# Patient Record
Sex: FEMALE | Race: WHITE | NOT HISPANIC OR LATINO | Employment: FULL TIME | URBAN - METROPOLITAN AREA
[De-identification: names, ages, dates, MRNs, and addresses within clinical notes are randomized per-mention and may not be internally consistent; named-entity substitution may affect disease eponyms.]

---

## 2018-03-09 ENCOUNTER — TRANSCRIBE ORDERS (OUTPATIENT)
Dept: ADMINISTRATIVE | Facility: HOSPITAL | Age: 43
End: 2018-03-09

## 2018-03-09 DIAGNOSIS — Z12.31 VISIT FOR SCREENING MAMMOGRAM: Primary | ICD-10-CM

## 2018-03-28 ENCOUNTER — APPOINTMENT (OUTPATIENT)
Dept: MAMMOGRAPHY | Facility: HOSPITAL | Age: 43
End: 2018-03-28
Attending: OBSTETRICS & GYNECOLOGY

## 2018-04-12 ENCOUNTER — HOSPITAL ENCOUNTER (OUTPATIENT)
Dept: MAMMOGRAPHY | Facility: HOSPITAL | Age: 43
Discharge: HOME | End: 2018-04-12
Attending: OBSTETRICS & GYNECOLOGY | Admitting: INTERNAL MEDICINE

## 2018-04-12 DIAGNOSIS — Z12.31 VISIT FOR SCREENING MAMMOGRAM: ICD-10-CM

## 2018-04-12 PROCEDURE — 77063 BREAST TOMOSYNTHESIS BI: CPT

## 2018-04-12 PROCEDURE — 77067 SCR MAMMO BI INCL CAD: CPT | Performed by: RADIOLOGY

## 2018-04-12 PROCEDURE — 77067 SCR MAMMO BI INCL CAD: CPT

## 2018-04-12 PROCEDURE — 77063 BREAST TOMOSYNTHESIS BI: CPT | Performed by: RADIOLOGY

## 2019-09-23 ENCOUNTER — TRANSCRIBE ORDERS (OUTPATIENT)
Dept: FAMILY MEDICINE CLINIC | Facility: CLINIC | Age: 44
End: 2019-09-23

## 2019-09-23 DIAGNOSIS — Z12.31 VISIT FOR SCREENING MAMMOGRAM: Primary | ICD-10-CM

## 2019-11-21 ENCOUNTER — HOSPITAL ENCOUNTER (OUTPATIENT)
Dept: MAMMOGRAPHY | Facility: HOSPITAL | Age: 44
Discharge: HOME | End: 2019-11-21
Admitting: INTERNAL MEDICINE

## 2019-11-21 DIAGNOSIS — Z12.31 VISIT FOR SCREENING MAMMOGRAM: ICD-10-CM

## 2019-11-21 PROCEDURE — 77063 BREAST TOMOSYNTHESIS BI: CPT

## 2019-11-21 PROCEDURE — 77063 BREAST TOMOSYNTHESIS BI: CPT | Performed by: RADIOLOGY

## 2019-11-21 PROCEDURE — 77067 SCR MAMMO BI INCL CAD: CPT

## 2019-11-21 PROCEDURE — 77067 SCR MAMMO BI INCL CAD: CPT | Performed by: RADIOLOGY

## 2020-09-30 ENCOUNTER — OFFICE VISIT (OUTPATIENT)
Dept: OBSTETRICS AND GYNECOLOGY | Facility: CLINIC | Age: 45
End: 2020-09-30

## 2020-09-30 VITALS
DIASTOLIC BLOOD PRESSURE: 68 MMHG | WEIGHT: 142.7 LBS | HEIGHT: 65 IN | SYSTOLIC BLOOD PRESSURE: 108 MMHG | BODY MASS INDEX: 23.78 KG/M2

## 2020-09-30 DIAGNOSIS — Z80.0 FAMILY HISTORY OF COLON CANCER: ICD-10-CM

## 2020-09-30 DIAGNOSIS — N84.0 POLYP OF CORPUS UTERI: ICD-10-CM

## 2020-09-30 DIAGNOSIS — Z01.419 ENCOUNTER FOR ANNUAL ROUTINE GYNECOLOGICAL EXAMINATION: Primary | ICD-10-CM

## 2020-09-30 PROCEDURE — 99386 PREV VISIT NEW AGE 40-64: CPT | Performed by: OBSTETRICS & GYNECOLOGY

## 2020-09-30 RX ORDER — VORTIOXETINE 10 MG/1
TABLET, FILM COATED ORAL
COMMUNITY
End: 2021-11-15

## 2020-09-30 RX ORDER — MELATONIN
2000 DAILY
COMMUNITY

## 2020-09-30 RX ORDER — MULTIPLE VITAMINS W/ MINERALS TAB 9MG-400MCG
1 TAB ORAL DAILY
COMMUNITY

## 2020-09-30 RX ORDER — LIOTHYRONINE SODIUM 5 UG/1
10 TABLET ORAL DAILY
COMMUNITY
Start: 2020-07-09

## 2020-09-30 RX ORDER — ALPRAZOLAM 0.25 MG/1
TABLET ORAL
COMMUNITY
End: 2021-11-15

## 2020-09-30 RX ORDER — CHLORAL HYDRATE 500 MG
CAPSULE ORAL
COMMUNITY

## 2020-09-30 RX ORDER — LEVOTHYROXINE SODIUM 0.03 MG/1
25 TABLET ORAL DAILY
COMMUNITY
Start: 2020-07-09

## 2020-09-30 NOTE — PROGRESS NOTES
GYN Annual Exam     CC - Here for annual exam.        MARTY  Ashlee Bagley is a 45 y.o. female, , who presents for annual well woman exam. Patient's last menstrual period was 2020 (exact date)..  Periods are regular every 25-35 days, lasting 4 days. .  Dysmenorrhea:none.  Patient reports problems with: anxiety and moodiness prior to start of menses.  Partner Status: Marital Status: .  New Partners since last visit: no.  Desires STD Screening: no.  Patient states she still having periods.  They are becoming lighter.  Has been on compounded testosterone cream and progesterone due to decreased libido.    Additional OB/GYN History   Current contraception: contraceptive methods: Vasectomy     Last Pap :   Last Completed Pap Smear       Status Date      PAP SMEAR Done 2019 negative        History of abnormal Pap smear: no  Family history of uterine, colon, breast, or ovarian cancer: yes - colon cancer in brother  Performs monthly Self-Breast Exam: no  Last mammogram:   Last Completed Mammogram     Patient has no health maintenance due at this time         Exercises Regularly: yes  Feelings of Anxiety or Depression: no  Tobacco Usage?: No   OB History        4    Para   2    Term   2            AB   2    Living   2       SAB   1    TAB        Ectopic        Molar        Multiple        Live Births   2                Health Maintenance   Topic Date Due   • Annual Gynecologic Pelvic and Breast Exam  1973   • ANNUAL PHYSICAL  1976   • INFLUENZA VACCINE  2020   • HEPATITIS C SCREENING  2020   • COLONOSCOPY  11/15/2021   • PAP SMEAR  2022   • TDAP/TD VACCINES (2 - Td) 2024   • Pneumococcal Vaccine 0-64  Aged Out       The additional following portions of the patient's history were reviewed and updated as appropriate: allergies, current medications, past family history, past medical history, past social history, past surgical history and problem  "list.    Review of Systems   All other systems reviewed and are negative.    All other systems reviewed and are negative.     I have reviewed and agree with the HPI, ROS, and historical information as entered above. Tyron Bauer MD    Objective   /68 (BP Location: Right arm, Patient Position: Sitting, Cuff Size: Adult)   Ht 165.1 cm (65\")   Wt 64.7 kg (142 lb 11.2 oz)   LMP 09/01/2020 (Exact Date)   Breastfeeding No   BMI 23.75 kg/m²     Physical Exam  Vitals signs and nursing note reviewed. Exam conducted with a chaperone present.   Constitutional:       Appearance: She is well-developed.   HENT:      Head: Normocephalic and atraumatic.   Neck:      Musculoskeletal: Normal range of motion. No muscular tenderness.      Thyroid: No thyroid mass or thyromegaly.   Cardiovascular:      Rate and Rhythm: Normal rate and regular rhythm.      Heart sounds: No murmur.   Pulmonary:      Effort: Pulmonary effort is normal. No retractions.      Breath sounds: Normal breath sounds. No wheezing, rhonchi or rales.   Chest:      Chest wall: No mass or tenderness.      Breasts:         Right: Normal. No mass, nipple discharge, skin change or tenderness.         Left: Normal. No mass, nipple discharge, skin change or tenderness.      Comments: Breast implants present bilaterally.  No contraction or tenderness.  Abdominal:      General: Bowel sounds are normal.      Palpations: Abdomen is soft. Abdomen is not rigid. There is no mass.      Tenderness: There is no abdominal tenderness. There is no guarding.      Hernia: No hernia is present. There is no hernia in the left inguinal area.   Genitourinary:     Labia:         Right: No rash, tenderness or lesion.         Left: No rash, tenderness or lesion.       Vagina: Normal. No vaginal discharge or lesions.      Cervix: No cervical motion tenderness, discharge, lesion or cervical bleeding.      Uterus: Normal. Not enlarged, not fixed and not tender.       Adnexa:       "   Right: No mass or tenderness.          Left: No mass or tenderness.        Rectum: No external hemorrhoid.   Neurological:      Mental Status: She is alert and oriented to person, place, and time.   Psychiatric:         Behavior: Behavior normal.            Assessment and Plan    Problem List Items Addressed This Visit        Genitourinary    Polyp of corpus uteri    Overview     MyoSure resection of polyp; 7/31/2015            Other    Family history of colon cancer    Overview     Brother; age 44           Other Visit Diagnoses     Encounter for annual routine gynecological examination    -  Primary    Relevant Orders    Pap IG, Rfx HPV ASCU    Mammo Screening Digital Tomosynthesis Bilateral With CAD          1. GYN annual well woman exam.  45 years old.  Has mammogram scheduled.  2. Reviewed monthly self breast exams.  Instructed to call with lumps, pain, or breast discharge.    3. Ordered Mammogram today  4. RTC in 1 year or PRN with problems.  5.   Patient had last colonoscopy in 2018 with 1 polyp removed.  She is going to repeat in 3 years.  6.   Decreased libido.  Has been getting compounded testosterone and progesterone from outside clinic.    Tyron Bauer MD  09/30/2020

## 2021-01-04 ENCOUNTER — HOSPITAL ENCOUNTER (OUTPATIENT)
Dept: MAMMOGRAPHY | Facility: HOSPITAL | Age: 46
Discharge: HOME | End: 2021-01-04
Admitting: OBSTETRICS & GYNECOLOGY

## 2021-01-04 DIAGNOSIS — Z01.419 ENCOUNTER FOR ANNUAL ROUTINE GYNECOLOGICAL EXAMINATION: ICD-10-CM

## 2021-01-04 PROCEDURE — 77067 SCR MAMMO BI INCL CAD: CPT

## 2021-01-04 PROCEDURE — 77067 SCR MAMMO BI INCL CAD: CPT | Performed by: RADIOLOGY

## 2021-01-04 PROCEDURE — 77063 BREAST TOMOSYNTHESIS BI: CPT | Performed by: RADIOLOGY

## 2021-01-04 PROCEDURE — 77063 BREAST TOMOSYNTHESIS BI: CPT

## 2021-11-15 ENCOUNTER — OFFICE VISIT (OUTPATIENT)
Dept: OBSTETRICS AND GYNECOLOGY | Facility: CLINIC | Age: 46
End: 2021-11-15

## 2021-11-15 VITALS
SYSTOLIC BLOOD PRESSURE: 110 MMHG | WEIGHT: 147 LBS | HEIGHT: 63 IN | BODY MASS INDEX: 26.05 KG/M2 | DIASTOLIC BLOOD PRESSURE: 72 MMHG

## 2021-11-15 DIAGNOSIS — Z12.11 SCREENING FOR COLON CANCER: ICD-10-CM

## 2021-11-15 DIAGNOSIS — Z12.31 ENCOUNTER FOR SCREENING MAMMOGRAM FOR MALIGNANT NEOPLASM OF BREAST: ICD-10-CM

## 2021-11-15 DIAGNOSIS — Z80.0 FAMILY HISTORY OF COLON CANCER: ICD-10-CM

## 2021-11-15 DIAGNOSIS — N84.0 POLYP OF CORPUS UTERI: ICD-10-CM

## 2021-11-15 DIAGNOSIS — N92.3 INTERMENSTRUAL BLEEDING: ICD-10-CM

## 2021-11-15 DIAGNOSIS — Z01.419 ENCOUNTER FOR ANNUAL ROUTINE GYNECOLOGICAL EXAMINATION: Primary | ICD-10-CM

## 2021-11-15 DIAGNOSIS — Z12.4 SCREENING FOR CERVICAL CANCER: ICD-10-CM

## 2021-11-15 PROCEDURE — 99396 PREV VISIT EST AGE 40-64: CPT | Performed by: OBSTETRICS & GYNECOLOGY

## 2021-11-15 RX ORDER — OMEPRAZOLE 10 MG/1
CAPSULE, DELAYED RELEASE ORAL
COMMUNITY

## 2021-11-15 RX ORDER — ESCITALOPRAM OXALATE 10 MG/1
TABLET ORAL
COMMUNITY
Start: 2021-11-08

## 2021-11-15 NOTE — PROGRESS NOTES
GYN Annual Exam     CC - Here for annual exam.        HPI  Ashlee Bagley is a 46 y.o. female, , who presents for annual well woman exam. Patient's last menstrual period was 11/10/2021 (approximate)..  Periods are regular every 25-35 days, lasting 4 days. .  Dysmenorrhea:mild, occurring first 1-2 days of flow.  Patient reports problems with: none. There were no changes to her medical or surgical history since her last visit.. Partner Status: Marital Status: .  New Partners since last visit: no.  Desires STD Screening: no.  Has been post vasectomy.    Patient states that her menses have been regular since she has noticed episodes of intermenstrual bleeding within the last 7 months.  That have been random and brief.  She also had some spotting after a massage.  Patient is on nightly progesterone for sleep and denies any relation of the bleeding to starting or stopping the progesterone.  She is on Lexapro for anxiety and states it is stable.  She is followed by PCP for hypothyroidism.    Additional OB/GYN History   Current contraception: contraceptive methods: None  Desires to: do not start contraception  Last Pap :   Last Completed Pap Smear          Ordered - PAP SMEAR (Every 3 Years) Ordered on 11/15/2021    2020  Pap IG, Rfx HPV ASCU    2019  Done - negative              History of abnormal Pap smear: no  Family history of uterine, colon, breast, or ovarian cancer: yes - brother - colon cancer  Performs monthly Self-Breast Exam: no  Last mammogram: 2021    Last Completed Mammogram     This patient has no relevant Health Maintenance data.         Exercises Regularly: yes  Feelings of Anxiety or Depression: yes - Treated  Tobacco Usage?: No   OB History        4    Para   2    Term   2            AB   2    Living   2       SAB   1    IAB        Ectopic        Molar        Multiple        Live Births   2                Health Maintenance   Topic Date Due   • ANNUAL  "PHYSICAL  Never done   • COVID-19 Vaccine (1) Never done   • HEPATITIS C SCREENING  Never done   • INFLUENZA VACCINE  08/01/2021   • Annual Gynecologic Pelvic and Breast Exam  10/01/2021   • COLORECTAL CANCER SCREENING  11/15/2021   • PAP SMEAR  09/30/2023   • TDAP/TD VACCINES (2 - Td or Tdap) 01/01/2024   • Pneumococcal Vaccine 0-64  Aged Out       The additional following portions of the patient's history were reviewed and updated as appropriate: allergies, current medications, past family history, past medical history, past social history, past surgical history and problem list.    Review of Systems   Constitutional: Negative.    HENT: Negative.    Eyes: Negative.    Respiratory: Negative.    Cardiovascular: Negative.    Gastrointestinal: Negative.    Endocrine: Negative.    Genitourinary: Negative.    Musculoskeletal: Negative.    Skin: Negative.    Allergic/Immunologic: Negative.    Neurological: Negative.    Hematological: Negative.    Psychiatric/Behavioral: Negative.          I have reviewed and agree with the HPI, ROS, and historical information as entered above. Tyron Bauer MD    Objective   /72   Ht 160 cm (63\")   Wt 66.7 kg (147 lb)   LMP 11/10/2021 (Approximate)   BMI 26.04 kg/m²     Physical Exam  Vitals and nursing note reviewed. Exam conducted with a chaperone present.   Constitutional:       Appearance: She is well-developed.   HENT:      Head: Normocephalic and atraumatic.   Neck:      Thyroid: No thyroid mass or thyromegaly.   Cardiovascular:      Rate and Rhythm: Normal rate and regular rhythm.      Heart sounds: No murmur heard.      Pulmonary:      Effort: Pulmonary effort is normal. No retractions.      Breath sounds: Normal breath sounds. No wheezing, rhonchi or rales.   Chest:      Chest wall: No mass or tenderness.   Breasts:      Right: Normal. No mass, nipple discharge, skin change or tenderness.      Left: Normal. No mass, nipple discharge, skin change or tenderness. "        Comments: Bilateral breast implants in place.  Abdominal:      General: Bowel sounds are normal. There is no distension.      Palpations: Abdomen is soft. Abdomen is not rigid. There is no mass.      Tenderness: There is no abdominal tenderness. There is no guarding.      Hernia: No hernia is present. There is no hernia in the left inguinal area.   Genitourinary:     Labia:         Right: No rash, tenderness or lesion.         Left: No rash, tenderness or lesion.       Vagina: Normal. No vaginal discharge or lesions.      Cervix: No cervical motion tenderness, discharge, lesion or cervical bleeding.      Uterus: Normal. Not enlarged, not fixed and not tender.       Adnexa:         Right: No mass or tenderness.          Left: No mass or tenderness.        Rectum: No external hemorrhoid.      Comments: Cervix with dark spotting present for menses.  Musculoskeletal:      Cervical back: Normal range of motion. No muscular tenderness.   Neurological:      Mental Status: She is alert and oriented to person, place, and time.   Psychiatric:         Behavior: Behavior normal.            Assessment and Plan    Problem List Items Addressed This Visit     Family history of colon cancer    Overview     Brother; age 44         Polyp of corpus uteri    Overview     MyoSure resection of polyp; 7/31/2015         Screening for cervical cancer    Overview     Pap 9/25/2019 was negative  Pap smear 9/30/2020 was negative.         Encounter for screening mammogram for malignant neoplasm of breast    Overview     Mammogram 1/4/2021 was negative.         Screening for colon cancer    Overview     2018 1 polyp benign. 5 year repeat.          Intermenstrual bleeding    Overview     2 episodes of intermenstrual bleeding over the past 7 months.  Also noticed some spotting after a massage.  Has history of endometrial polyp.    Will need ultrasound if recurs.           Other Visit Diagnoses     Encounter for annual routine gynecological  examination    -  Primary    Relevant Orders    Pap IG, Rfx HPV ASCU    Mammo Screening Digital Tomosynthesis Bilateral With CAD          1. GYN annual well woman exam.  46 years of age.  States menses are still regular.  2. Had normal Pap smear for last 2 years.  Pap not indicated this year.  3. Due for mammogram in January.  4. Colonoscopy 2018 with 5-year repeat; family history of colon cancer in her brother.  5. Intermenstrual spotting x2.  Will need ultrasound if recurs.  6. Reviewed monthly self breast exams.  Instructed to call with lumps, pain, or breast discharge.    7. Ordered Mammogram today  8. Reviewed exercise as a preventative health measures.   9. Reccommended Flu Vaccine in Fall of each year.  10. RTC in 1 year or PRN with problems.  11. Return in about 1 year (around 11/15/2022) for Annual physical.     Tyron Bauer MD  11/15/2021

## 2021-11-24 DIAGNOSIS — Z01.419 ENCOUNTER FOR ANNUAL ROUTINE GYNECOLOGICAL EXAMINATION: ICD-10-CM

## 2022-01-20 ENCOUNTER — APPOINTMENT (OUTPATIENT)
Dept: MAMMOGRAPHY | Facility: HOSPITAL | Age: 47
End: 2022-01-20

## 2022-02-11 ENCOUNTER — APPOINTMENT (OUTPATIENT)
Dept: MAMMOGRAPHY | Facility: HOSPITAL | Age: 47
End: 2022-02-11

## 2022-03-23 ENCOUNTER — HOSPITAL ENCOUNTER (OUTPATIENT)
Dept: MAMMOGRAPHY | Facility: HOSPITAL | Age: 47
Discharge: HOME | End: 2022-03-23
Admitting: INTERNAL MEDICINE

## 2022-03-23 DIAGNOSIS — Z01.419 ENCOUNTER FOR ANNUAL ROUTINE GYNECOLOGICAL EXAMINATION: ICD-10-CM

## 2022-03-23 PROCEDURE — 77063 BREAST TOMOSYNTHESIS BI: CPT

## 2022-03-23 PROCEDURE — 77063 BREAST TOMOSYNTHESIS BI: CPT | Performed by: RADIOLOGY

## 2022-03-23 PROCEDURE — 77067 SCR MAMMO BI INCL CAD: CPT | Performed by: RADIOLOGY

## 2022-03-23 PROCEDURE — 77067 SCR MAMMO BI INCL CAD: CPT

## 2022-05-06 ENCOUNTER — HOSPITAL ENCOUNTER (OUTPATIENT)
Dept: MAMMOGRAPHY | Facility: HOSPITAL | Age: 47
Discharge: HOME | End: 2022-05-06
Admitting: RADIOLOGY

## 2022-05-06 DIAGNOSIS — R92.8 ABNORMAL MAMMOGRAM: ICD-10-CM

## 2022-05-06 PROCEDURE — 77061 BREAST TOMOSYNTHESIS UNI: CPT | Performed by: RADIOLOGY

## 2022-05-06 PROCEDURE — G0279 TOMOSYNTHESIS, MAMMO: HCPCS

## 2022-05-06 PROCEDURE — 77065 DX MAMMO INCL CAD UNI: CPT

## 2022-05-06 PROCEDURE — 77065 DX MAMMO INCL CAD UNI: CPT | Performed by: RADIOLOGY

## 2023-03-13 ENCOUNTER — TRANSCRIBE ORDERS (OUTPATIENT)
Dept: ADMINISTRATIVE | Facility: HOSPITAL | Age: 48
End: 2023-03-13
Payer: COMMERCIAL

## 2023-03-13 DIAGNOSIS — Z12.31 VISIT FOR SCREENING MAMMOGRAM: Primary | ICD-10-CM

## 2023-05-24 ENCOUNTER — OFFICE VISIT (OUTPATIENT)
Dept: OBSTETRICS AND GYNECOLOGY | Facility: CLINIC | Age: 48
End: 2023-05-24
Payer: COMMERCIAL

## 2023-05-24 VITALS
SYSTOLIC BLOOD PRESSURE: 102 MMHG | WEIGHT: 142.6 LBS | BODY MASS INDEX: 25.27 KG/M2 | HEIGHT: 63 IN | DIASTOLIC BLOOD PRESSURE: 70 MMHG

## 2023-05-24 DIAGNOSIS — Z01.419 ENCOUNTER FOR ANNUAL ROUTINE GYNECOLOGICAL EXAMINATION: Primary | ICD-10-CM

## 2023-05-24 DIAGNOSIS — Z12.31 ENCOUNTER FOR SCREENING MAMMOGRAM FOR MALIGNANT NEOPLASM OF BREAST: ICD-10-CM

## 2023-05-24 NOTE — PROGRESS NOTES
Gynecologic Annual Exam Note          GYN Annual Exam     Gynecologic Exam        Subjective     HPI  Ashlee Bagley is a 48 y.o. female, , who presents for annual well woman exam as a established patient. Patient's last menstrual period was 2023 (approximate). Patient reports problems with: Her periods have been monthly up until Feb, no period in March and spotting in April., lasting 5 days. The flow is light.. She reports dysmenorrhea is none. Partner Status: Marital Status: . She is is sexually active. She has not had new partners.. STD testing recommendations have been explained to the patient and she does not desire STD testing. There were no changes to her medical or surgical history since her last visit..       Additional OB/GYN History   Current contraception: contraceptive methods: Vasectomy   Desires to: continue contraception    Last Pap : 11/15/21. Result: negative. HPV: not done.   Last Completed Pap Smear          Ordered - PAP SMEAR (Every 3 Years) Ordered on 2023    11/15/2021  SCANNED - PAP SMEAR    2020  Pap IG, Rfx HPV ASCU    2019  Done - negative              History of abnormal Pap smear: states unsure of possibility of one in early 20's.  Family history of uterine, colon, breast, or ovarian cancer: yes - colon-brother  Performs monthly Self-Breast Exam: yes  Last mammogram: 22. Done at  Neg hx of left breast asymmetry      Last Completed Mammogram          Ordered - MAMMOGRAM (Every 2 Years) Ordered on 3/13/2023    2022  Mammo Diagnostic Digital Tomosynthesis Left With CAD    2022  Mammo Screening Digital Tomosynthesis Bilateral With CAD    2021  Mammo Screening Digital Tomosynthesis Bilateral With CAD    2019  Mammo Screening Digital Tomosynthesis Bilateral With CAD    2018  Mammo Screening Digital Tomosynthesis Bilateral With CAD    Only the first 5 history entries have been loaded, but more history exists.                 History of abnormal mammogram:     Colonoscopy: has had a colonoscopy 5 year(s) ago. polyp and diverticulosis   Exercises Regularly: yes  Feelings of Anxiety or Depression: no  Tobacco Usage?: No       Current Outpatient Medications:   •  cholecalciferol (VITAMIN D3) 25 MCG (1000 UT) tablet, Take 2 tablets by mouth Daily., Disp: , Rfl:   •  escitalopram (LEXAPRO) 10 MG tablet, , Disp: , Rfl:   •  levothyroxine (SYNTHROID, LEVOTHROID) 25 MCG tablet, Take 1 tablet by mouth Daily., Disp: , Rfl:   •  liothyronine (CYTOMEL) 5 MCG tablet, Take 2 tablets by mouth Daily., Disp: , Rfl:   •  Multiple Vitamins-Minerals (multivitamin with minerals) tablet tablet, Take 1 tablet by mouth Daily., Disp: , Rfl:   •  Omega-3 Fatty Acids (fish oil) 1000 MG capsule capsule, Take  by mouth Daily With Breakfast., Disp: , Rfl:   •  omeprazole (prilOSEC) 10 MG capsule, omeprazole 10 mg capsule,delayed release  Take 2 capsules every day by oral route., Disp: , Rfl:   •  progesterone (PROMETRIUM) 200 MG capsule, TAKE ONE Capsule BY MOUTH EVERY NIGHT AT BEDTIME, Disp: , Rfl:   •  Testosterone 20 % cream, apply TWO clicks twice daily, Disp: , Rfl:      Patient denies the need for medication refills today.    OB History        4    Para   2    Term   2            AB   2    Living   2       SAB   1    IAB        Ectopic        Molar        Multiple        Live Births   2                Past Medical History:   Diagnosis Date   • Polyp, corpus uteri     Myosure resection 2015 - benign   • Situational anxiety         Past Surgical History:   Procedure Laterality Date   • AUGMENTATION MAMMAPLASTY Bilateral     Silicone   • COLONOSCOPY  2018    1 benign polyp removed, diverticulosis; repeat 3 years   • DILATATION AND CURETTAGE  2004    MAB   • MYOSURE  2015    RESECTION OF POLYPS;BENIGN       Health Maintenance   Topic Date Due   • HEPATITIS C SCREENING  Never done   • ANNUAL PHYSICAL  Never done   •  "COLORECTAL CANCER SCREENING  11/15/2021   • COVID-19 Vaccine (6 - Booster for Moderna series) 01/14/2022   • Annual Gynecologic Pelvic and Breast Exam  11/16/2022   • ZOSTER VACCINE (1 of 2) Never done   • INFLUENZA VACCINE  08/01/2023   • TDAP/TD VACCINES (2 - Td or Tdap) 01/01/2024   • MAMMOGRAM  05/06/2024   • PAP SMEAR  11/15/2024   • Pneumococcal Vaccine 0-64  Aged Out       The additional following portions of the patient's history were reviewed and updated as appropriate: allergies, current medications, past family history, past medical history, past social history, past surgical history and problem list.    Review of Systems   Constitutional: Negative.    Cardiovascular: Negative.    Gastrointestinal: Negative.    Genitourinary: Negative.    Psychiatric/Behavioral: Negative.          I have reviewed and agree with the HPI, ROS, and historical information as entered above. Marlen Main CAMARA        Objective   /70 Comment: 80/60  Ht 160 cm (63\")   Wt 64.7 kg (142 lb 9.6 oz)   LMP 04/14/2023 (Approximate)   BMI 25.26 kg/m²     Physical Exam  Vitals and nursing note reviewed. Exam conducted with a chaperone present.   Constitutional:       Appearance: She is well-developed.   HENT:      Head: Normocephalic and atraumatic.   Neck:      Thyroid: No thyroid mass or thyromegaly.   Cardiovascular:      Rate and Rhythm: Normal rate and regular rhythm.      Heart sounds: No murmur heard.  Pulmonary:      Effort: Pulmonary effort is normal. No retractions.      Breath sounds: Normal breath sounds. No wheezing, rhonchi or rales.   Chest:      Chest wall: No mass or tenderness.   Breasts:     Right: Normal. No mass, nipple discharge, skin change or tenderness.      Left: Normal. No mass, nipple discharge, skin change or tenderness.      Comments: Bilateral breast implants present  Abdominal:      Palpations: Abdomen is soft. Abdomen is not rigid. There is no mass.      Tenderness: There is no abdominal " tenderness. There is no guarding.      Hernia: No hernia is present.   Genitourinary:     General: Normal vulva.      Labia:         Right: No rash, tenderness or lesion.         Left: No rash, tenderness or lesion.       Vagina: Normal. No vaginal discharge or lesions.      Cervix: No cervical motion tenderness, discharge, lesion or cervical bleeding.      Uterus: Normal. Not enlarged, not fixed and not tender.       Adnexa: Right adnexa normal and left adnexa normal.        Right: No mass or tenderness.          Left: No mass or tenderness.        Rectum: Normal. No external hemorrhoid.   Musculoskeletal:      Cervical back: Normal range of motion. No muscular tenderness.   Neurological:      Mental Status: She is alert and oriented to person, place, and time.   Psychiatric:         Behavior: Behavior normal.            Assessment and Plan    Problem List Items Addressed This Visit    None  Visit Diagnoses     Encounter for annual routine gynecological examination    -  Primary    Relevant Orders    LIQUID-BASED PAP SMEAR, [facility] ([facility])          1. GYN annual well woman exam.   2. Pap guidelines reviewed.  3. Will monitor periods for now  4. Colonoscopy due this year. Pt. Is aware of this.  5. Reviewed monthly self breast exams.  Instructed to call with lumps, pain, or breast discharge.    6. Ordered Mammogram today  7. Reviewed exercise as a preventative health measures.   8. Reccommended Flu Vaccine in Fall of each year.  9. RTC in 1 year or PRN with problems.      Marlen Quach, APRN  05/24/2023

## 2023-05-26 LAB — REF LAB TEST METHOD: NORMAL

## 2024-04-29 RX ORDER — SODIUM, POTASSIUM,MAG SULFATES 17.5-3.13G
1 SOLUTION, RECONSTITUTED, ORAL ORAL EVERY 12 HOURS
Qty: 354 ML | Refills: 0 | Status: SHIPPED | OUTPATIENT
Start: 2024-04-29

## 2024-05-15 ENCOUNTER — OUTSIDE FACILITY SERVICE (OUTPATIENT)
Dept: GASTROENTEROLOGY | Facility: CLINIC | Age: 49
End: 2024-05-15
Payer: COMMERCIAL

## 2024-05-15 PROCEDURE — 45378 DIAGNOSTIC COLONOSCOPY: CPT | Performed by: INTERNAL MEDICINE

## 2024-06-03 ENCOUNTER — OFFICE VISIT (OUTPATIENT)
Dept: OBSTETRICS AND GYNECOLOGY | Facility: CLINIC | Age: 49
End: 2024-06-03
Payer: COMMERCIAL

## 2024-06-03 VITALS
DIASTOLIC BLOOD PRESSURE: 72 MMHG | BODY MASS INDEX: 24.38 KG/M2 | HEIGHT: 63 IN | SYSTOLIC BLOOD PRESSURE: 102 MMHG | WEIGHT: 137.6 LBS

## 2024-06-03 DIAGNOSIS — Z12.11 SCREENING FOR COLON CANCER: ICD-10-CM

## 2024-06-03 DIAGNOSIS — Z79.890 HORMONE REPLACEMENT THERAPY (HRT): ICD-10-CM

## 2024-06-03 DIAGNOSIS — Z12.31 ENCOUNTER FOR SCREENING MAMMOGRAM FOR MALIGNANT NEOPLASM OF BREAST: ICD-10-CM

## 2024-06-03 DIAGNOSIS — Z12.4 SCREENING FOR CERVICAL CANCER: Primary | ICD-10-CM

## 2024-06-03 DIAGNOSIS — Z80.0 FAMILY HISTORY OF COLON CANCER: ICD-10-CM

## 2024-06-03 PROCEDURE — 99396 PREV VISIT EST AGE 40-64: CPT | Performed by: OBSTETRICS & GYNECOLOGY

## 2024-06-03 RX ORDER — DESVENLAFAXINE SUCCINATE 50 MG/1
25 TABLET, EXTENDED RELEASE ORAL DAILY
COMMUNITY

## 2024-06-03 NOTE — PROGRESS NOTES
Gynecologic Annual Exam Note        GYN Annual Exam     CC - Here for annual exam.        HPI  Ashlee Bagley is a 49 y.o. female, , who presents for annual well woman exam as a established patient.  She is postmenopausal.. Reports vaginal bleeding, describes as spotting for three days in 2024.  Reports this with missing a dose of progesterone. Pt states that her hormones have been checked and pt states that she is menopausal and is followed by physician with testosterone and progesterone.  Since her last visit the patient has stopped having a period. Marital Status: .  She is sexually active. She has not had new partners.. STD testing recommendations have been explained to the patient and she declines STD testing.    The patient would like to discuss the following complaints today: None    Additional OB/GYN History   On HRT? Yes. Details: progesterone     Last Pap : 23. Results: negative. HPV: not done. Her last HPV testing was never.  Last Completed Pap Smear            Ordered - PAP SMEAR (Every 3 Years) Ordered on 6/3/2024      2023  LIQUID-BASED PAP SMEAR, [facility] ([facility])    11/15/2021  SCANNED - PAP SMEAR    2020  Pap IG, Rfx HPV ASCU    2019  Done - negative                  History of abnormal Pap smear: no  Family history of uterine, colon, breast, or ovarian cancer: yes - Brother-colon  Performs monthly Self-Breast Exam: yes  Last mammogram: 23. Done at . There is a copy in the chart.    Last Completed Mammogram            MAMMOGRAM (Every 2 Years) Next due on 2023  Mammo Screening Digital Tomosynthesis Bilateral With CAD    2022  Mammo Diagnostic Digital Tomosynthesis Left With CAD    2022  Mammo Screening Digital Tomosynthesis Bilateral With CAD    2021  Mammo Screening Digital Tomosynthesis Bilateral With CAD    2019  Mammo Screening Digital Tomosynthesis Bilateral With CAD    Only the first 5  history entries have been loaded, but more history exists.                  Last colonoscopy: has had a colonoscopy 1 month ago Neg    Last Completed Colonoscopy            COLORECTAL CANCER SCREENING (COLONOSCOPY - Every 3 Years) Next due on 5/15/2027      05/15/2024  Colonoscopy, Scan    11/15/2018  COLONOSCOPY (Done - 1 benign polyp removed, diverticulosis)                    She has never had a bone density scan  Exercises Regularly: yes  Feelings of Anxiety or Depression: yes - anxiety      Tobacco Usage?: No       Current Outpatient Medications:     cholecalciferol (VITAMIN D3) 25 MCG (1000 UT) tablet, Take 2 tablets by mouth Daily., Disp: , Rfl:     desvenlafaxine (PRISTIQ) 50 MG 24 hr tablet, Take 25 mg by mouth Daily., Disp: , Rfl:     levothyroxine (SYNTHROID, LEVOTHROID) 25 MCG tablet, Take 1 tablet by mouth Daily., Disp: , Rfl:     liothyronine (CYTOMEL) 5 MCG tablet, Take 2 tablets by mouth Daily., Disp: , Rfl:     Multiple Vitamins-Minerals (multivitamin with minerals) tablet tablet, Take 1 tablet by mouth Daily., Disp: , Rfl:     Omega-3 Fatty Acids (fish oil) 1000 MG capsule capsule, Take  by mouth Daily With Breakfast., Disp: , Rfl:     progesterone (PROMETRIUM) 200 MG capsule, TAKE ONE Capsule BY MOUTH EVERY NIGHT AT BEDTIME, Disp: , Rfl:     Testosterone 20 % cream, apply TWO clicks twice daily, Disp: , Rfl:     escitalopram (LEXAPRO) 10 MG tablet, , Disp: , Rfl:     omeprazole (prilOSEC) 10 MG capsule, omeprazole 10 mg capsule,delayed release  Take 2 capsules every day by oral route., Disp: , Rfl:     sodium-potassium-magnesium sulfates (Suprep Bowel Prep Kit) 17.5-3.13-1.6 GM/177ML solution oral solution, Take 1 bottle by mouth Every 12 (Twelve) Hours. (Patient not taking: Reported on 6/3/2024), Disp: 354 mL, Rfl: 0    Patient denies the need for medication refills today.    OB History          4    Para   2    Term   2            AB   2    Living   2         SAB   1    IAB         "Ectopic        Molar        Multiple        Live Births   2                Past Medical History:   Diagnosis Date    Polyp, corpus uteri     Myosure resection 7/31/2015 - benign    Situational anxiety         Past Surgical History:   Procedure Laterality Date    AUGMENTATION MAMMAPLASTY Bilateral 2011    Silicone    COLONOSCOPY  11/2018    1 benign polyp removed, diverticulosis; repeat 3 years    DILATATION AND CURETTAGE  02/2004    [facility]    MYOSURE  07/31/2015    RESECTION OF POLYPS;BENIGN       Health Maintenance   Topic Date Due    HEPATITIS C SCREENING  Never done    ANNUAL PHYSICAL  Never done    ZOSTER VACCINE (1 of 2) Never done    COVID-19 Vaccine (6 - 2023-24 season) 09/01/2023    TDAP/TD VACCINES (2 - Td or Tdap) 01/01/2024    Annual Gynecologic Pelvic and Breast Exam  05/25/2024    INFLUENZA VACCINE  08/01/2024    MAMMOGRAM  06/28/2025    PAP SMEAR  05/24/2026    COLORECTAL CANCER SCREENING  05/15/2027    Pneumococcal Vaccine 0-64  Aged Out       The additional following portions of the patient's history were reviewed and updated as appropriate: allergies, current medications, past family history, past medical history, past social history, past surgical history, and problem list.    Review of Systems   Constitutional: Negative.    HENT: Negative.     Eyes: Negative.    Respiratory: Negative.     Cardiovascular: Negative.    Gastrointestinal: Negative.    Endocrine: Negative.    Genitourinary: Negative.    Musculoskeletal: Negative.    Allergic/Immunologic: Negative.    Neurological: Negative.    Hematological: Negative.    Psychiatric/Behavioral: Negative.         I have reviewed and agree with the HPI, ROS, and historical information as entered above.   Tyron Bauer MD      Objective   /72   Ht 160 cm (63\")   Wt 62.4 kg (137 lb 9.6 oz)   BMI 24.37 kg/m²     Physical Exam  Vitals and nursing note reviewed. Exam conducted with a chaperone present.   Constitutional:       Appearance: Normal " appearance. She is well-developed and normal weight.   HENT:      Head: Normocephalic and atraumatic.   Neck:      Thyroid: No thyroid mass or thyromegaly.   Cardiovascular:      Rate and Rhythm: Normal rate and regular rhythm.      Heart sounds: Normal heart sounds. No murmur heard.  Pulmonary:      Effort: Pulmonary effort is normal. No retractions.      Breath sounds: Normal breath sounds. No wheezing, rhonchi or rales.   Chest:      Chest wall: No mass or tenderness.   Breasts:     Right: Normal. No mass, nipple discharge, skin change or tenderness.      Left: Normal. No mass, nipple discharge, skin change or tenderness.      Comments: Breast implants in place.  Abdominal:      General: Bowel sounds are normal.      Palpations: Abdomen is soft. Abdomen is not rigid. There is no mass.      Tenderness: There is no abdominal tenderness. There is no guarding.      Hernia: No hernia is present. There is no hernia in the left inguinal area.   Genitourinary:     General: Normal vulva.      Exam position: Lithotomy position.      Labia:         Right: No rash, tenderness or lesion.         Left: No rash, tenderness or lesion.       Vagina: Normal. No vaginal discharge, bleeding or lesions.      Cervix: No cervical motion tenderness, discharge, lesion or cervical bleeding.      Uterus: Normal. Not enlarged, not fixed and not tender.       Adnexa:         Right: No mass or tenderness.          Left: No mass or tenderness.        Rectum: No external hemorrhoid.      Comments: No vaginal discharge.  Cervix without lesions or discharge.  Uterus is small mobile nontender.  No adnexal masses or tenderness.  Musculoskeletal:      Cervical back: Normal range of motion. No tenderness. No muscular tenderness.   Lymphadenopathy:      Cervical: No cervical adenopathy.   Neurological:      Mental Status: She is alert and oriented to person, place, and time.   Psychiatric:         Behavior: Behavior normal.            Assessment and  Plan    Problem List Items Addressed This Visit       Encounter for screening mammogram for malignant neoplasm of breast    Overview     Mammogram 1/4/2021 was negative.  Mammogram 6/28/2023 was negative         Family history of colon cancer    Overview     Brother; age 44         Hormone replacement therapy (HRT)    Overview     Is getting compounded hormone replacement therapy         Screening for cervical cancer - Primary    Overview     Pap 9/25/2019 was negative  Pap smear 9/30/2020 was negative.  Pap smear 11/15/2021 was negative.  Pap smear 5/24/2023 was negative.         Relevant Orders    LIQUID-BASED PAP SMEAR WITH HPV GENOTYPING REGARDLESS OF INTERPRETATION ([facility])    Screening for colon cancer    Overview     2018 1 polyp benign. 5 year repeat.   Colonoscopy 5/15/2024 was negative. Dr Awa GUTIERREZ            GYN annual well woman exam.  Postmenopausal for 1 year and then had 3 days of light menses in January.  Menopausal symptoms have improved.  Has been using compounded testosterone cream and oral progesterone from hormone clinic in [place].  Recent colonoscopy was negative.  Reviewed monthly self breast exams.  Instructed to call with lumps, pain, or breast discharge.  Yearly mammograms ordered.  Ordered mammogram today.  Recommended use of Vitamin D and getting adequate calcium in her diet. (1500mg)  Reviewed exercise as a preventative health measures.   Recommended Flu Vaccine in Fall of each year.  RTC in 1 year or PRN with problems.  Return in about 1 year (around 6/3/2025) for Annual physical.         Tyron Bauer MD  06/03/2024

## 2024-06-06 LAB — REF LAB TEST METHOD: NORMAL

## 2024-09-03 LAB
NCCN CRITERIA FLAG: ABNORMAL
TYRER CUZICK SCORE: 9.2

## 2024-09-11 DIAGNOSIS — Z13.79 GENETIC TESTING: Primary | ICD-10-CM

## 2024-09-18 ENCOUNTER — HOSPITAL ENCOUNTER (OUTPATIENT)
Dept: MAMMOGRAPHY | Facility: HOSPITAL | Age: 49
Discharge: HOME | End: 2024-09-18
Admitting: OBSTETRICS & GYNECOLOGY
Payer: COMMERCIAL

## 2024-09-18 DIAGNOSIS — Z12.31 ENCOUNTER FOR SCREENING MAMMOGRAM FOR MALIGNANT NEOPLASM OF BREAST: ICD-10-CM

## 2024-09-18 PROCEDURE — 77067 SCR MAMMO BI INCL CAD: CPT

## 2024-09-18 PROCEDURE — 77063 BREAST TOMOSYNTHESIS BI: CPT

## 2024-11-21 ENCOUNTER — LAB (OUTPATIENT)
Facility: HOSPITAL | Age: 49
End: 2024-11-21
Payer: COMMERCIAL

## 2024-11-21 DIAGNOSIS — Z13.79 GENETIC TESTING: ICD-10-CM

## 2024-11-21 PROCEDURE — 36415 COLL VENOUS BLD VENIPUNCTURE: CPT

## 2024-12-10 LAB
AMBRY INTERPRETATION REPORT: NORMAL
GENE DIS ANL INTERP-IMP: NORMAL

## 2025-06-25 ENCOUNTER — OFFICE VISIT (OUTPATIENT)
Dept: OBSTETRICS AND GYNECOLOGY | Facility: CLINIC | Age: 50
End: 2025-06-25
Payer: COMMERCIAL

## 2025-06-25 VITALS
HEIGHT: 63 IN | WEIGHT: 131 LBS | SYSTOLIC BLOOD PRESSURE: 102 MMHG | BODY MASS INDEX: 23.21 KG/M2 | DIASTOLIC BLOOD PRESSURE: 64 MMHG

## 2025-06-25 DIAGNOSIS — Z12.31 ENCOUNTER FOR SCREENING MAMMOGRAM FOR MALIGNANT NEOPLASM OF BREAST: ICD-10-CM

## 2025-06-25 DIAGNOSIS — Z01.419 ROUTINE GYNECOLOGICAL EXAMINATION: Primary | ICD-10-CM

## 2025-06-25 NOTE — PROGRESS NOTES
Gynecologic Annual Exam Note        GYN Annual Exam     CC - Here for annual exam.        HPI  Ashlee Bagley is a 50 y.o. female, , who presents for annual well woman exam as an established patient of practice who is new to me.  She is postmenopausal. Denies vaginal bleeding.   Since her last visit she has started on HRT. Marital Status: .  She is sexually active. She has not had new partners. STD testing recommendations have been explained to the patient and she declines STD testing.    The patient would like to discuss the following complaints today: none. Started testosterone and estrogen pellets in January, has been feeling much better.      Additional OB/GYN History   On HRT? Yes. Details: testosterone and estrogen pellets and oral progesterone.    Last Pap : 2024. Results: negative. HPV: negative.   Last Completed Pap Smear            Upcoming       PAP SMEAR (Every 3 Years) Next due on 6/3/2027      2024  LIQUID-BASED PAP SMEAR WITH HPV GENOTYPING REGARDLESS OF INTERPRETATION ([facility])    2023  LIQUID-BASED PAP SMEAR, [facility] ([facility])    11/15/2021  SCANNED - PAP SMEAR    2020  Pap IG, Rfx HPV ASCU    2019  Done - negative     Only the first 5 history entries have been loaded, but more history exists.                        History of abnormal Pap smear: no  Family history of uterine, colon, breast, or ovarian cancer: yes - Colon: Brother  Performs monthly Self-Breast Exam: yes  Last mammogram: 2024. Done at . There is a copy in the chart.    Last Completed Mammogram            Awaiting Completion       MAMMOGRAM (Every 2 Years) Order placed this encounter      2025  Order placed for Mammo Screening Digital Tomosynthesis Bilateral With CAD by Awa Diaz APRN    2024  Mammo Screening Digital Tomosynthesis Bilateral With CAD    2023  Mammo Screening Digital Tomosynthesis Bilateral With CAD    2022  Mammo  Diagnostic Digital Tomosynthesis Left With CAD    2022  Mammo Screening Digital Tomosynthesis Bilateral With CAD      Only the first 5 history entries have been loaded, but more history exists.                          Last colonoscopy: May 2024    Last Completed Colonoscopy            Needs Review       COLORECTAL CANCER SCREENING (COLONOSCOPY - Every 3 Years) Tentatively due on 5/15/2027      05/15/2024  Colonoscopy, Scan    11/15/2018  COLONOSCOPY (Done - 1 benign polyp removed, diverticulosis)                            She has never had a bone density scan  Exercises Regularly: yes  Feelings of Anxiety or Depression: no      Tobacco Usage?: No       Current Outpatient Medications:     cholecalciferol (VITAMIN D3) 25 MCG (1000 UT) tablet, Take 2 tablets by mouth Daily., Disp: , Rfl:     desvenlafaxine (PRISTIQ) 50 MG 24 hr tablet, Take 25 mg by mouth Daily., Disp: , Rfl:     levothyroxine (SYNTHROID, LEVOTHROID) 25 MCG tablet, Take 1 tablet by mouth Daily., Disp: , Rfl:     liothyronine (CYTOMEL) 5 MCG tablet, Take 2 tablets by mouth Daily., Disp: , Rfl:     Multiple Vitamins-Minerals (multivitamin with minerals) tablet tablet, Take 1 tablet by mouth Daily., Disp: , Rfl:     Omega-3 Fatty Acids (fish oil) 1000 MG capsule capsule, Take  by mouth Daily With Breakfast., Disp: , Rfl:     progesterone (PROMETRIUM) 200 MG capsule, TAKE ONE Capsule BY MOUTH EVERY NIGHT AT BEDTIME, Disp: , Rfl:     Testosterone 20 % cream, apply TWO clicks twice daily, Disp: , Rfl:     Patient denies the need for medication refills today.    OB History          4    Para   2    Term   2            AB   2    Living   2         SAB   1    IAB        Ectopic        Molar        Multiple        Live Births   2                Past Medical History:   Diagnosis Date    Depression     Disease of thyroid gland     Migraine     PMS (premenstrual syndrome)     Polyp, corpus uteri     Myosure resection 2015 - benign     "Situational anxiety         Past Surgical History:   Procedure Laterality Date    AUGMENTATION MAMMAPLASTY Bilateral 2011    Silicone    COLONOSCOPY  11/2018    1 benign polyp removed, diverticulosis; repeat 3 years    DILATATION AND CURETTAGE  02/2004    [facility]    MYOSURE  07/31/2015    RESECTION OF POLYPS;BENIGN       Health Maintenance   Topic Date Due    HEPATITIS C SCREENING  Never done    ANNUAL PHYSICAL  Never done    Pneumococcal Vaccine 50+ (1 of 1 - PCV) Never done    ZOSTER VACCINE (1 of 2) Never done    Annual Gynecologic Pelvic and Breast Exam  05/25/2024    COVID-19 Vaccine (6 - 2024-25 season) 09/01/2024    INFLUENZA VACCINE  07/01/2025    MAMMOGRAM  09/18/2026    COLORECTAL CANCER SCREENING  05/15/2027    PAP SMEAR  06/03/2027    TDAP/TD VACCINES (3 - Td or Tdap) 06/10/2034       The additional following portions of the patient's history were reviewed and updated as appropriate: allergies, current medications, past family history, past medical history, past social history, past surgical history, and problem list.    Review of Systems   Constitutional: Negative.    HENT: Negative.     Eyes: Negative.    Respiratory: Negative.     Cardiovascular: Negative.    Gastrointestinal: Negative.    Endocrine: Negative.    Genitourinary: Negative.    Musculoskeletal: Negative.    Skin: Negative.    Allergic/Immunologic: Negative.    Neurological: Negative.    Hematological: Negative.    Psychiatric/Behavioral: Negative.         I have reviewed and agree with the HPI, ROS, and historical information as entered above. HOA Lopez      Objective   /64 (BP Location: Right arm)   Ht 160 cm (63\")   Wt 59.4 kg (131 lb)   LMP 04/14/2023 (Approximate)   BMI 23.21 kg/m²     Physical Exam  Vitals and nursing note reviewed. Exam conducted with a chaperone present.   Constitutional:       General: She is not in acute distress.     Appearance: Normal appearance. She is well-developed and normal weight. She is not " ill-appearing.   Neck:      Thyroid: No thyroid mass or thyromegaly.   Pulmonary:      Effort: Pulmonary effort is normal. No respiratory distress or retractions.   Chest:      Chest wall: No mass.   Breasts:     Right: Normal. No mass, nipple discharge, skin change or tenderness.      Left: Normal. No mass, nipple discharge, skin change or tenderness.      Comments: Implants present bilaterally  Abdominal:      General: There is no distension.      Palpations: Abdomen is soft. Abdomen is not rigid. There is no mass.      Tenderness: There is no abdominal tenderness. There is no guarding or rebound.      Hernia: No hernia is present.   Genitourinary:     General: Normal vulva.      Exam position: Lithotomy position.      Labia:         Right: No rash, tenderness or lesion.         Left: No rash, tenderness or lesion.       Vagina: Normal. No vaginal discharge, bleeding or lesions.      Cervix: Normal. No cervical motion tenderness, discharge or cervical bleeding.      Uterus: Normal. Not enlarged, not fixed and not tender.       Adnexa: Right adnexa normal and left adnexa normal.        Right: No mass or tenderness.          Left: No mass or tenderness.        Rectum: Normal. No external hemorrhoid.   Musculoskeletal:      Cervical back: No muscular tenderness.   Skin:     General: Skin is warm and dry.   Neurological:      Mental Status: She is alert and oriented to person, place, and time.   Psychiatric:         Mood and Affect: Mood normal.         Behavior: Behavior normal.            Assessment and Plan    Problem List Items Addressed This Visit       Encounter for screening mammogram for malignant neoplasm of breast          Overview    Mammogram 1/4/2021 was negative.  Mammogram 6/28/2023 was negative    November 2024; [facility] testing was negative for 39 cancer causing genetic mutations.         Relevant Orders    Mammo Screening Digital Tomosynthesis Bilateral With CAD     Other Visit Diagnoses         Routine  gynecological examination    -  Primary            GYN annual well woman exam.   Reviewed monthly self breast exams.  Instructed to call with lumps, pain, or breast discharge.  Yearly mammograms ordered.  Ordered mammogram today.  Reviewed [facility] ovarian cancer screening program.  RTC in 1 year or PRN with problems.  Return in about 1 year (around 6/25/2026) for Annual physical.         Awa Diaz, HOA  06/25/2025